# Patient Record
Sex: FEMALE | Race: WHITE | NOT HISPANIC OR LATINO | URBAN - METROPOLITAN AREA
[De-identification: names, ages, dates, MRNs, and addresses within clinical notes are randomized per-mention and may not be internally consistent; named-entity substitution may affect disease eponyms.]

---

## 2023-03-03 ENCOUNTER — APPOINTMENT (RX ONLY)
Dept: URBAN - METROPOLITAN AREA CLINIC 101 | Facility: CLINIC | Age: 43
Setting detail: DERMATOLOGY
End: 2023-03-03

## 2023-03-03 VITALS — HEIGHT: 64 IN | WEIGHT: 130 LBS

## 2023-03-03 DIAGNOSIS — Z41.9 ENCOUNTER FOR PROCEDURE FOR PURPOSES OTHER THAN REMEDYING HEALTH STATE, UNSPECIFIED: ICD-10-CM

## 2023-03-03 PROCEDURE — ? ADDITIONAL NOTES

## 2023-03-03 PROCEDURE — ? DVT RISK ASSESSMENT

## 2023-03-03 NOTE — PROCEDURE: ADDITIONAL NOTES
Render Risk Assessment In Note?: no
Additional Notes: Patient's concerns include: \\n-  Lack of upper lip fullness. \\n-  Lower eyelid bags. \\n\\nFactors altering surgical decisions (hx/exam findings): \\n-  Exam:  hollow appearing periorbital area both along the upper and lower lids. No excess skin or fat herniation along upper eyelids. Mild to moderate fat herniation/bags along the under eyes. Minimal to no lateral canthal lines. Upper lip with minimal fullness. Upper lip length not significantly long and relatively within normal range. \\n\\nProposed intervention(s):\\n-  Would no recommend upper lip lift, scar burden would be more obvious than benefit from lift. \\n-  Could potentially benefit from a lower blepharoplasty, but lateral incisional scars may be more obvious due to lack of periorbital rhytides. \\n-  Proposed surgeries:\\n    -  Liposuction to bilateral medial and/or lateral thighs. \\n    -  Bilateral lower blepharoplasty. \\n    -  Fat grafting to lower periorbital area/midface. \\n\\n-  If patient serious about moving forward with surgery would need preop photos.

## 2023-03-03 NOTE — HPI: EYE (BLEPHAROPLASTY CONSULTATION, COSMETIC)
Is This A New Presentation, Or A Follow-Up?: other
How Severe Is It?: moderate
Additional History: Patient would also like to consult regarding lip lift.

## 2024-06-04 ENCOUNTER — APPOINTMENT (RX ONLY)
Dept: URBAN - METROPOLITAN AREA CLINIC 101 | Facility: CLINIC | Age: 44
Setting detail: DERMATOLOGY
End: 2024-06-04

## 2024-06-04 DIAGNOSIS — Z41.9 ENCOUNTER FOR PROCEDURE FOR PURPOSES OTHER THAN REMEDYING HEALTH STATE, UNSPECIFIED: ICD-10-CM

## 2024-06-04 PROCEDURE — ? ADDITIONAL NOTES

## 2024-06-04 NOTE — PROCEDURE: ADDITIONAL NOTES
Additional Notes: Patient presents for a consultation for lower eyelid surgery.\\n\\nPatient is a Candidate for
Render Risk Assessment In Note?: no